# Patient Record
Sex: MALE | Race: WHITE
[De-identification: names, ages, dates, MRNs, and addresses within clinical notes are randomized per-mention and may not be internally consistent; named-entity substitution may affect disease eponyms.]

---

## 2017-11-26 NOTE — CR
EXAM DATE: 17



PATIENT'S AGE: 32





Patient: MARYAM GALVAN



Facility: [place], [place]

Patient ID: [id number]

Site Patient ID: [id number].

Site Accession #: [id number].

: 1985

Study: XRay Spine Lumbar [id number]/26/2017 5:46:08 AM

Ordering Physician: Doctor Drake



Final Report: 

INDICATION:

Low back pain 



TECHNIQUE:

Lumbar spine 3 view.



COMPARISON:

None 



FINDINGS:

Bones: Alignment is normal. No fractures or significant bone lesions. 

Joints: Disc spaces and facets are unremarkable. 

Soft tissues: Unremarkable. 



IMPRESSION:

Unremarkable lumbar spine.





Dictated by Suzie Navas MD @ 2017 6:13AM

(Electronic Signature)



Report Signed by Proxy.
CRAIG

## 2017-11-26 NOTE — EDM.PDOC
ED HPI GENERAL MEDICAL PROBLEM





- General


Chief Complaint: Back Pain or Injury


Stated Complaint: BACK PAIN


Time Seen by Provider: 11/26/17 04:17


Source of Information: Reports: Patient


History Limitations: Reports: No Limitations





- History of Present Illness


INITIAL COMMENTS - FREE TEXT/NARRATIVE: 





32 year old male presents to ED with complaint of lower back pain. Pain began 3-

4 weeks. He denies any associated trauma that may have initiated the pain. Pain 

is shocking quality and occasionally radiates down his left leg to above the 

knee. Pain worsened significantly today and he is having difficulty with sitting

, standing or laying down, therefore he decided to present to the ED. He denies 

any significant tenderness along the spine and denies any loss of bladder or 

bowel control. He does not have a PCP and this is his first time seeking care 

for the back pain. 


  ** Lower Posterior Back


Pain Score (Numeric/FACES): 8





- Related Data


 Allergies











Allergy/AdvReac Type Severity Reaction Status Date / Time


 


Penicillins Allergy  Anaphylactic Verified 11/26/17 04:28





   Shock  











Home Meds: 


 Home Meds





Orphenadrine [Norflex] 100 mg PO BID 5 Days #10 tab.er 11/26/17 [Rx]











ED ROS GENERAL





- Review of Systems


Review Of Systems: See Below


Constitutional: Reports: No Symptoms


HEENT: Reports: No Symptoms


Respiratory: Reports: No Symptoms


Cardiovascular: Reports: No Symptoms


Endocrine: Reports: No Symptoms


GI/Abdominal: Reports: No Symptoms


: Reports: No Symptoms


Musculoskeletal: Reports: Back Pain


Skin: Reports: No Symptoms


Neurological: Reports: No Symptoms


Psychiatric: Reports: No Symptoms


Hematologic/Lymphatic: Reports: No Symptoms


Immunologic: Reports: No Symptoms





ED EXAM,LOWER BACK PAIN/INJURY





- Physical Exam


Exam: See Below


General Appearance: Alert, WD/WN


Eye Exam: Bilateral Eye: PERRL


Ears: Normal External Exam, Normal Canal, Hearing Grossly Normal, Normal TMs


Nose: Normal Inspection, Normal Mucosa, No Blood


Throat/Mouth: Normal Inspection, Normal Oropharynx, No Airway Compromise


Head: Atraumatic, Normocephalic


Neck: Normal Inspection, Supple, Non-Tender, Full Range of Motion


Respiratory/Chest: No Respiratory Distress, Lungs Clear, Normal Breath Sounds, 

No Accessory Muscle Use, Chest Non-Tender


Cardiovascular: Normal Peripheral Pulses, Regular Rate, Rhythm, No JVD


GI/Abdominal: Normal Bowel Sounds, Soft, Non-Tender


Back Exam: Other (pain with lumbar flexion. SLR negative BL. mild paravertebral 

tenderness BL. no palpable step-offs. )


Extremities: Normal Inspection, Normal Capillary Refill


Neurological: Alert, Normal Mood/Affect, Normal Dorsiflexion, CN II-XII Intact, 

Normal Plantar Flexion, Normal Gait, Normal Reflexes, No Motor/Sensory Deficits

, Oriented x 3


Skin Exam: Warm, Dry, Intact


Lymphatic: No Adenopathy





Course





- Vital Signs


Last Recorded V/S: 


 Last Vital Signs











Temp  36.8 C   11/26/17 04:18


 


Pulse  68   11/26/17 04:18


 


Resp  16   11/26/17 04:18


 


BP  109/69   11/26/17 04:18


 


Pulse Ox  96   11/26/17 04:18














- Orders/Labs/Meds


Orders: 


 Active Orders 24 hr











 Category Date Time Status


 


 Lumbar Spine 2 or 3V [CR] Stat Exams  11/26/17 04:38 Taken


 


 Orphenadrine [Norflex] Med  11/26/17 04:30 Active





 60 mg IM Q12H   








 Medication Orders





Orphenadrine Citrate (Norflex)  60 mg IM Q12H JONATHAN


   Last Admin: 11/26/17 04:54 Dose:  Not Given








Meds: 


Medications











Generic Name Dose Route Start Last Admin





  Trade Name Freq  PRN Reason Stop Dose Admin


 


Orphenadrine Citrate  60 mg  11/26/17 04:30  11/26/17 04:54





  Norflex  IM   Not Given





  Q12H JONATHAN   














Discontinued Medications














Generic Name Dose Route Start Last Admin





  Trade Name Freq  PRN Reason Stop Dose Admin


 


Ketorolac Tromethamine  30 mg  11/26/17 04:27  11/26/17 04:54





  Toradol  IM  11/26/17 04:28  Not Given





  ONETIME ONE   














Departure





- Departure


Time of Disposition: 06:21


Disposition: Home, Self-Care 01


Condition: Good


Clinical Impression: 


 Sciatica





Acute lumbar myofascial strain


Qualifiers:


 Encounter type: initial encounter Qualified Code(s): S39.012A - Strain of 

muscle, fascia and tendon of lower back, initial encounter








- Discharge Information


Prescriptions: 


Orphenadrine [Norflex] 100 mg PO BID 5 Days #10 tab.er


Instructions:  Sciatica, Easy-to-Read, Back Pain, Adult, Easy-to-Read


Referrals: 


PCP,None [Primary Care Provider] - 


Forms:  ED Department Discharge


Additional Instructions: 


The following information is given to patients seen in the emergency department 

who are being discharged to home. This information is to outline your options 

for follow-up care. We provide all patients seen in our emergency department 

with a follow-up referral.





The need for follow-up, as well as the timing and circumstances, are variable 

depending upon the specifics of your emergency department visit.





If you don't have a primary care physician on staff, we will provide you with a 

referral. We always advise you to contact your personal physician following an 

emergency department visit to inform them of the circumstance of the visit and 

for follow-up with them and/or the need for any referrals to a consulting 

specialist.





The emergency department will also refer you to a specialist when appropriate. 

This referral assures that you have the opportunity for followup care with a 

specialist. All of these measure are taken in an effort to provide you with 

optimal care, which includes your followup.





Under all circumstances we always encourage you to contact your private 

physician who remains a resource for coordinating  your care. When calling for 

followup care, please make the office aware that this follow-up is from your 

recent emergency room visit. If for any reason you are refused follow-up, 

please contact the [facility] emergency department at [phone] 

and asked to speak to the emergency department charge nurse.





- Problem List Review


Problem List Initiated/Reviewed/Updated: Yes





- My Orders


Last 24 Hours: 


My Active Orders





11/26/17 04:30


Orphenadrine [Norflex]   60 mg IM Q12H 














- Assessment/Plan


Last 24 Hours: 


My Active Orders





11/26/17 04:30


Orphenadrine [Norflex]   60 mg IM Q12H 











Plan: 





Diagnostics:


Lumbar XR





Therapeutics:


Toradol 30 mg IM single dose, Orphenadrine 60 mg IM single dose 





Assessment:


1. Acute Lumbosacral Strain


-no visible spinal deformities, no spinal tenderness


-no loss of bladder/bowel control


-no history of trauma


-Lumbar XR shows no acute changes 





2. Sciatica, secondary to #1





Plan:


Provided patient education and reassurance. Explained natural course of 

condition. Recommended he avoid prolonged immobilization. Prescribed 

Orphenadrine 100 mg PO BID for 5 days. Recommended following-up with PCP for 

rferral to PT if no improvement of symptoms within 1-2 weeks. Recommended Using 

OTC NSAID's or Acetaminophen for pain control.

## 2018-02-15 NOTE — EDM.PDOC
ED HPI GENERAL MEDICAL PROBLEM





- General


Stated Complaint: BACK PAIN


Time Seen by Provider: 02/15/18 02:35


Source of Information: Reports: Patient


History Limitations: Reports: No Limitations





- History of Present Illness


INITIAL COMMENTS - FREE TEXT/NARRATIVE: 


HISTORY AND PHYSICAL:





History of present illness:


[32-year-old male presenting in emergency department with chief complaint of 

lower back pain radiating into his left lower extremity.





Patient states that he has had intermittent back pain for many years. He has 

been once told he has sciatica. States it today came in because he had 

significantly more pain and was having difficulty with sleeping and walking. 

Pain is primarily located in the left lumbar sparing radiating into the 

posterior aspect of the left leg down to the left calf. He denies any bowel or 

bladder incontinence. Denies any decreased strength or sensation. He has no 

other symptoms and currently denies any chest pain, palpitations, shortness 

breath, syncopal episodes, or focal neurologic deficits.]





Review of systems: 


As per history of present illness and below otherwise all systems reviewed and 

negative.





Past medical history: 


As per history of present illness and as reviewed below otherwise 

noncontributory.





Surgical history: 


As per history of present illness and as reviewed below otherwise 

noncontributory.





Social history: 


No reported history of drug or alcohol abuse.





Family history: 


As per history of present illness and as reviewed below otherwise 

noncontributory.





Physical exam:


HEENT: Atraumatic, normocephalic, pupils reactive, negative for conjunctival 

pallor or scleral icterus, mucous membranes moist, throat clear, neck supple, 

nontender, trachea midline.


Lungs: Clear to auscultation, breath sounds equal bilaterally, chest nontender.


Heart: S1S2, regular, negative for clicks, rubs, or JVD.


Abdomen: Soft, nondistended, nontender. Negative for masses or 

hepatosplenomegaly. Negative for costovertebral tenderness.


Pelvis: Stable nontender.


Genitourinary: Deferred.


Rectal: Deferred.


Extremities: Atraumatic, negative for cords or calf pain. Neurovascular 

unremarkable.


Neuro: Awake, alert, oriented. Cranial nerves II through XII unremarkable. 

Cerebellum unremarkable. Motor and sensory unremarkable throughout. Exam 

nonfocal.





Diagnostics:


[]





Therapeutics:


[Portable 6 mg IM]





Impression: 


[Lumbar back pain/sciatica]





Plan:


[Patient was given 60 mg Toradol IM as well as a prescription for Flexeril. He 

was told to follow-up with primary care physician and that he should 

participate in physical therapy once he is established with a primary care 

physician.]











- Related Data


 Allergies











Allergy/AdvReac Type Severity Reaction Status Date / Time


 


Penicillins Allergy  Anaphylactic Verified 11/26/17 04:28





   Shock  











Home Meds: 


 Home Meds





Orphenadrine [Norflex] 100 mg PO BID 5 Days #10 tab.er 11/26/17 [Rx]


Cyclobenzaprine [Flexeril] 10 mg PO TID #12 tab 02/15/18 [Rx]











Past Medical History


Other Cardiovascular History: Some sort of cardiac issue is unclear as to wha 

it is "[place]"


Other Musculoskeletal History: Right ankle sugery X 3





Social & Family History





- Tobacco Use


Used Tobacco, but Quit: No


Second Hand Smoke Exposure: No





- Caffeine Use


Caffeine Use: Reports: Soda





- Recreational Drug Use


Recreational Drug Use: No





ED ROS GENERAL





- Review of Systems


Review Of Systems: See Below





ED EXAM, GENERAL





- Physical Exam


Exam: See Below





Course





- Orders/Labs/Meds


Meds: 


Medications














Discontinued Medications














Generic Name Dose Route Start Last Admin





  Trade Name Freq  PRN Reason Stop Dose Admin


 


Ketorolac Tromethamine  60 mg  02/15/18 02:46  





  Toradol  IM  02/15/18 02:47  





  ONETIME ONE   














Departure





- Departure


Time of Disposition: 02:59


Disposition: Home, Self-Care 01


Condition: Good


Clinical Impression: 


 Sciatica








- Discharge Information


Prescriptions: 


Cyclobenzaprine [Flexeril] 10 mg PO TID #12 tab


Instructions:  Back Pain, Adult, Sciatica, Sciatica, Easy-to-Read


Referrals: 


PCP,None [Primary Care Provider] -

## 2018-05-28 NOTE — EDM.PDOC
ED HPI GENERAL MEDICAL PROBLEM





- General


Chief Complaint: Neuro Symptoms/Deficits


Stated Complaint: CHEST PAIN, DIZZY, SHAKY


Time Seen by Provider: 05/28/18 17:09


Source of Information: Reports: Patient


History Limitations: Reports: No Limitations





- History of Present Illness


INITIAL COMMENTS - FREE TEXT/NARRATIVE: 


HISTORY AND PHYSICAL:


[]


33-year-old male patient who presented initially chest pain dizziness


History of Present Illness:


[]This started approximately half hour prior to coming to the emergency room he 

had just gotten off. Was eating a hamburger and driving


Extreme vertigo with turning his head





Review of Systems:


As per history of present illness and below otherwise all 


systems reviewed and negative.  





Past medical history:


As per history of present illness and as reviewed below


otherwise noncontributory.





Surgical history:


As per history of present illness and as reviewed below


otherwise noncontributory.





Social history:


No reported history of drug or alcohol abuse.





Family history:


As per history of present illness and as reviewed below


otherwise noncontributory.





Physical exam:


Alert oriented gentleman who is speaking well in full sentences without 

shortness of breath he is nontoxic in appearance


He is avoiding turning his head while speaking


HEENT: Atraumatic, normocehpalic, pupils reactive, negative for conjunctival 

pallor or scleral icterus, mucous membranes moist, throat clear, neck supple, 

nontender, trachea midline.  Tympanic membrane with effusion on the left 

bubbles are visualized no erythema


Lungs: Clear to auscultation, breath sounds equal bilaterally, chest non 

tender.  


Heart: S1S2, regular, negative for clicks, rubs, or JVD.


Abdomen: Soft, nondistended, nontender.  Negative for masses or 

hepatossplenmegaly. Negative for costovertebral tenderness.


Pelvis: Stable nontender.


Genitourinary: Deferred.


Rectal: Deferred


Extremities: Atraumatic, negative for cords or calf pain.  


Neurovascular unremarkable.


Neuro:  Awake, alert, oriented.  Cranial nerves II through XII


unremarkable.  Cerebellum unremarkable.  Motor and sensory unremarkable 

throughout.  Exam nonfocal.  


Discussed with the patient that his EKG shows tachycardia rhythm. Discussed 

that the troponin level was elevated. At this level can be elevated for other 

conditions however at this time he needs to be monitored closely have asked 

that he except a transfer to [facility] for cardiology is 

available. Patient is agreeable to this course of action he is quite anxious





Discussed this case with Dr. Jim Benjamin at [facility] who has accepted this 

patient for transfer. Question Dr. Benjamin if you should give Lovenox and in case 

this person has a pericarditis he requested I not give Lovenox.





Diagnostics:


[]CBC CMP troponin amylase lipase UA urine drug screen EKG chest x-ray





Therapeutics:


[]Normal saline


Aspirin





Impression:


[]Vertigo


Elevated troponin





Plan:


[]Transfer per EMS to [facility]





Definitive disposition and diagnosis as appropriate pending


reevaluation and review of above.  





Onset: Today, Sudden





- Related Data


 Allergies











Allergy/AdvReac Type Severity Reaction Status Date / Time


 


Penicillins Allergy  Anaphylactic Verified 05/28/18 17:10





   Shock  











Home Meds: 


 Home Meds





. [No Known Home Meds]  05/28/18 [History]











Past Medical History





- Past Health History


Medical/Surgical History: Denies Medical/Surgical History


HEENT History: Reports: None


Cardiovascular History: Reports: None


Other Cardiovascular History: Some sort of cardiac issue is unclear as to what 

it is "[place]", pt states this is a hole in his heart


Respiratory History: Reports: None


Gastrointestinal History: Reports: None


Genitourinary History: Reports: None


Other Musculoskeletal History: Right ankle sugery X 3


Neurological History: Reports: None


Psychiatric History: Reports: None


Endocrine/Metabolic History: Reports: None





- Infectious Disease History


Infectious Disease History: Reports: None





- Past Surgical History


Male  Surgical History: Reports: None





Social & Family History





- Family History


Family Medical History: Noncontributory





- Tobacco Use


Smoking Status *Q: Never Smoker


Years of Tobacco use: 4


Packs/Tins Daily: 1





- Caffeine Use


Caffeine Use: Reports: Soda





- Recreational Drug Use


Recreational Drug Use: No





ED ROS GENERAL





- Review of Systems


Review Of Systems: ROS reveals no pertinent complaints other than HPI.





ED EXAM, NEURO





- Physical Exam


Exam: See Below (see dictation)





EKG INTERPRETATION


EKG Date: 05/28/18


Rhythm: Other (Sinus tachycardia)


Rate (Beats/Min): 101


Comparison: NA - No Prior EKG





Course





- Vital Signs


Last Recorded V/S: 





 Last Vital Signs











Temp  36.8 C   05/28/18 17:05


 


Pulse  99   05/28/18 17:05


 


Resp  18   05/28/18 17:05


 


BP  154/86 H  05/28/18 17:05


 


Pulse Ox  97   05/28/18 17:05














- Orders/Labs/Meds


Orders: 





 Active Orders 24 hr











 Category Date Time Status


 


 Cardiac Monitoring [RC] .AS DIRECTED Care  05/28/18 17:08 Active


 


 EKG 12 Lead [EKG Documentation Completion] [RC] STAT Care  05/28/18 17:07 

Active


 


 EKG Documentation Completion [RC] STAT Care  05/28/18 17:08 Active


 


 Oxygen Therapy, ED [RC] ASDIRECTED Care  05/28/18 17:08 Active


 


 Chest 1V Frontal [CR] Stat Exams  05/28/18 17:09 Ordered


 


 Sinus Comp Min 3V [CR] Stat Exams  05/28/18 17:45 Ordered


 


 CULTURE STREP A CONFIRMATION [RM] Stat Lab  05/28/18 17:30 Results


 


 CULTURE URINE [RM] Stat Lab  05/28/18 17:30 Ordered


 


 DRUG SCREEN, URINE [URCHEM] Stat Lab  05/28/18 17:30 Ordered


 


 STREP SCRN A RAPID W CULT CONF [RM] Stat Lab  05/28/18 17:30 Ordered


 


 UA W/MICROSCOPIC [URIN] Stat Lab  05/28/18 17:30 Ordered


 


 Sodium Chloride 0.9% [Saline Flush] Med  05/28/18 17:08 Active





 10 ml FLUSH ASDIRECTED PRN   


 


 Sodium Chloride 0.9% [Saline Flush] Med  05/28/18 17:08 Active





 2.5 ml FLUSH ASDIRECTED PRN   


 


 Saline Lock Insert [OM.PC] Stat Oth  05/28/18 17:08 Ordered








 Medication Orders





Sodium Chloride (Saline Flush)  10 ml FLUSH ASDIRECTED PRN


   PRN Reason: Keep Vein Open


Sodium Chloride (Saline Flush)  2.5 ml FLUSH ASDIRECTED PRN


   PRN Reason: Keep Vein Open








Labs: 





 Laboratory Tests











  05/28/18 05/28/18 05/28/18 Range/Units





  17:18 17:18 17:18 


 


WBC    7.49  (4.0-11.0)  K/uL


 


RBC    5.49  (4.50-5.90)  M/uL


 


Hgb    16.0  (13.0-17.0)  g/dL


 


Hct    46.0  (38.0-50.0)  %


 


MCV    83.8  (80.0-98.0)  fL


 


MCH    29.1  (27.0-32.0)  pg


 


MCHC    34.8  (31.0-37.0)  g/dL


 


RDW Std Deviation    37.6  (28.0-62.0)  fl


 


RDW Coeff of Juan    12  (11.0-15.0)  %


 


Plt Count    237  (150-400)  K/uL


 


MPV    9.50  (7.40-12.00)  fL


 


Neut % (Auto)    60.4  (48.0-80.0)  %


 


Lymph % (Auto)    29.6  (16.0-40.0)  %


 


Mono % (Auto)    8.0  (0.0-15.0)  %


 


Eos % (Auto)    1.9  (0.0-7.0)  %


 


Baso % (Auto)    0.1  (0.0-1.5)  %


 


Neut # (Auto)    4.5  (1.4-5.7)  K/uL


 


Lymph # (Auto)    2.2  (0.6-2.4)  K/uL


 


Mono # (Auto)    0.6  (0.0-0.8)  K/uL


 


Eos # (Auto)    0.1  (0.0-0.7)  K/uL


 


Baso # (Auto)    0.0  (0.0-0.1)  K/uL


 


Nucleated RBC %    0.0  /100WBC


 


Nucleated RBCs #    0  K/uL


 


INR     


 


Sodium  137    (136-148)  mmol/L


 


Potassium  3.8    (3.5-5.1)  mmol/L


 


Chloride  102    ()  mmol/L


 


Carbon Dioxide  26.0    (21.0-32.0)  mmol/L


 


BUN  14    (7.0-18.0)  mg/dL


 


Creatinine  1.1    (0.8-1.3)  mg/dL


 


Est Cr Clr Drug Dosing  94.14    mL/min


 


Estimated GFR (MDRD)  > 60.0    ml/min


 


Glucose  137 H    ()  mg/dL


 


Calcium  8.5    (8.5-10.1)  mg/dL


 


Total Bilirubin  0.8    (0.2-1.0)  mg/dL


 


AST  36    (15-37)  IU/L


 


ALT  54    (14-63)  IU/L


 


Alkaline Phosphatase  54    ()  U/L


 


Ammonia   <17 L   (19-54)  ug/dL


 


Troponin I  0.161 H*    (0.000-0.056)  ng/mL


 


Total Protein  8.0    (6.4-8.2)  g/dL


 


Albumin  3.9    (3.4-5.0)  g/dL


 


Globulin  4.1 H    (2.0-3.5)  g/dL


 


Albumin/Globulin Ratio  1.0 L    (1.3-2.8)  


 


Amylase  22 L    ()  U/L


 


Lipase  87    ()  U/L


 


Urine Color     


 


Urine Appearance     


 


Urine pH     (5.0-8.0)  


 


Ur Specific Gravity     (1.001-1.035)  


 


Urine Protein     (NEGATIVE)  mg/dL


 


Urine Glucose (UA)     (NEGATIVE)  mg/dL


 


Urine Ketones     (NEGATIVE)  mg/dL


 


Urine Occult Blood     (NEGATIVE)  


 


Urine Nitrite     (NEGATIVE)  


 


Urine Bilirubin     (NEGATIVE)  


 


Urine Urobilinogen     (<2.0)  EU/dL


 


Ur Leukocyte Esterase     (NEGATIVE)  


 


Urine Opiates Screen     (NEGATIVE)  


 


Ur Oxycodone Screen     (NEGATIVE)  


 


Urine Methadone Screen     (NEGATIVE)  


 


Ur Barbiturates Screen     (NEGATIVE)  


 


Ur Phencyclidine Scrn     (NEGATIVE)  


 


Ur Amphetamine Screen     (NEGATIVE)  


 


U Methamphetamines Scrn     (NEGATIVE)  


 


U Benzodiazepines Scrn     (NEGATIVE)  


 


U Cocaine Metab Screen     (NEGATIVE)  


 


U Marijuana (THC) Screen     (NEGATIVE)  














  05/28/18 05/28/18 05/28/18 Range/Units





  17:18 17:30 17:30 


 


WBC     (4.0-11.0)  K/uL


 


RBC     (4.50-5.90)  M/uL


 


Hgb     (13.0-17.0)  g/dL


 


Hct     (38.0-50.0)  %


 


MCV     (80.0-98.0)  fL


 


MCH     (27.0-32.0)  pg


 


MCHC     (31.0-37.0)  g/dL


 


RDW Std Deviation     (28.0-62.0)  fl


 


RDW Coeff of Juan     (11.0-15.0)  %


 


Plt Count     (150-400)  K/uL


 


MPV     (7.40-12.00)  fL


 


Neut % (Auto)     (48.0-80.0)  %


 


Lymph % (Auto)     (16.0-40.0)  %


 


Mono % (Auto)     (0.0-15.0)  %


 


Eos % (Auto)     (0.0-7.0)  %


 


Baso % (Auto)     (0.0-1.5)  %


 


Neut # (Auto)     (1.4-5.7)  K/uL


 


Lymph # (Auto)     (0.6-2.4)  K/uL


 


Mono # (Auto)     (0.0-0.8)  K/uL


 


Eos # (Auto)     (0.0-0.7)  K/uL


 


Baso # (Auto)     (0.0-0.1)  K/uL


 


Nucleated RBC %     /100WBC


 


Nucleated RBCs #     K/uL


 


INR  1.05    


 


Sodium     (136-148)  mmol/L


 


Potassium     (3.5-5.1)  mmol/L


 


Chloride     ()  mmol/L


 


Carbon Dioxide     (21.0-32.0)  mmol/L


 


BUN     (7.0-18.0)  mg/dL


 


Creatinine     (0.8-1.3)  mg/dL


 


Est Cr Clr Drug Dosing     mL/min


 


Estimated GFR (MDRD)     ml/min


 


Glucose     ()  mg/dL


 


Calcium     (8.5-10.1)  mg/dL


 


Total Bilirubin     (0.2-1.0)  mg/dL


 


AST     (15-37)  IU/L


 


ALT     (14-63)  IU/L


 


Alkaline Phosphatase     ()  U/L


 


Ammonia     (19-54)  ug/dL


 


Troponin I     (0.000-0.056)  ng/mL


 


Total Protein     (6.4-8.2)  g/dL


 


Albumin     (3.4-5.0)  g/dL


 


Globulin     (2.0-3.5)  g/dL


 


Albumin/Globulin Ratio     (1.3-2.8)  


 


Amylase     ()  U/L


 


Lipase     ()  U/L


 


Urine Color   YELLOW   


 


Urine Appearance   CLEAR   


 


Urine pH   6.5   (5.0-8.0)  


 


Ur Specific Gravity   1.020   (1.001-1.035)  


 


Urine Protein   NEGATIVE   (NEGATIVE)  mg/dL


 


Urine Glucose (UA)   NEGATIVE   (NEGATIVE)  mg/dL


 


Urine Ketones   NEGATIVE   (NEGATIVE)  mg/dL


 


Urine Occult Blood   NEGATIVE   (NEGATIVE)  


 


Urine Nitrite   NEGATIVE   (NEGATIVE)  


 


Urine Bilirubin   NEGATIVE   (NEGATIVE)  


 


Urine Urobilinogen   0.2   (<2.0)  EU/dL


 


Ur Leukocyte Esterase   NEGATIVE   (NEGATIVE)  


 


Urine Opiates Screen    NEGATIVE  (NEGATIVE)  


 


Ur Oxycodone Screen    NEGATIVE  (NEGATIVE)  


 


Urine Methadone Screen    NEGATIVE  (NEGATIVE)  


 


Ur Barbiturates Screen    NEGATIVE  (NEGATIVE)  


 


Ur Phencyclidine Scrn    NEGATIVE  (NEGATIVE)  


 


Ur Amphetamine Screen    NEGATIVE  (NEGATIVE)  


 


U Methamphetamines Scrn    NEGATIVE  (NEGATIVE)  


 


U Benzodiazepines Scrn    NEGATIVE  (NEGATIVE)  


 


U Cocaine Metab Screen    NEGATIVE  (NEGATIVE)  


 


U Marijuana (THC) Screen    NEGATIVE  (NEGATIVE)  











Meds: 





Medications











Generic Name Dose Route Start Last Admin





  Trade Name Freq  PRN Reason Stop Dose Admin


 


Sodium Chloride  10 ml  05/28/18 17:08  





  Saline Flush  FLUSH   





  ASDIRECTED PRN   





  Keep Vein Open   





     





     





     


 


Sodium Chloride  2.5 ml  05/28/18 17:08  





  Saline Flush  FLUSH   





  ASDIRECTED PRN   





  Keep Vein Open   





     





     





     














Discontinued Medications














Generic Name Dose Route Start Last Admin





  Trade Name Freq  PRN Reason Stop Dose Admin


 


Aspirin  325 mg  05/28/18 17:26  05/28/18 17:47





  Aspirin  PO  05/28/18 17:27  325 mg





  ONETIME ONE   Administration





     





     





     





     














Departure





- Departure


Time of Disposition: 18:29


Disposition: DC[facility]fer to [facility]


Condition: Good


Clinical Impression: 


 Elevated troponin








- Discharge Information


Referrals: 


PCP,None [Primary Care Provider] - 


Additional Instructions: 


The following information is given to patients seen in the emergency department 

who are being discharged to home. This information is to outline your options 

for follow-up care. We provide all patients seen in our emergency department 

with a follow-up referral.





The need for follow-up, as well as the timing and circumstances, are variable 

depending upon the specifics of your emergency department visit.





If you don't have a primary care physician on staff, we will provide you with a 

referral. We always advise you to contact your personal physician following an 

emergency department visit to inform them of the circumstance of the visit and 

for follow-up with them and/or the need for any referrals to a consulting 

specialist.





The emergency department will also refer you to a specialist when appropriate. 

This referral assures that you have the opportunity for followup care with a 

specialist. All of these measure are taken in an effort to provide you with 

optimal care, which includes your followup.





Under all circumstances we always encourage you to contact your private 

physician who remains a resource for coordinating  your care. When calling for 

followup care, please make the office aware that this follow-up is from your 

recent emergency room visit. If for any reason you are refused follow-up, 

please contact the [facility] emergency department at [phone] 

and asked to speak to the emergency department charge nurse.





- My Orders


Last 24 Hours: 





My Active Orders





05/28/18 17:08


Cardiac Monitoring [RC] .AS DIRECTED 


EKG Documentation Completion [RC] STAT 


Oxygen Therapy, ED [RC] ASDIRECTED 


Sodium Chloride 0.9% [Saline Flush]   10 ml FLUSH ASDIRECTED PRN 


Sodium Chloride 0.9% [Saline Flush]   2.5 ml FLUSH ASDIRECTED PRN 


Saline Lock Insert [OM.PC] Stat 





05/28/18 17:09


Chest 1V Frontal [CR] Stat 





05/28/18 17:30


CULTURE STREP A CONFIRMATION [RM] Stat 


CULTURE URINE [RM] Stat 


DRUG SCREEN, URINE [URCHEM] Stat 


STREP SCRN A RAPID W CULT CONF [RM] Stat 


UA W/MICROSCOPIC [URIN] Stat 





05/28/18 17:45


Sinus Comp Min 3V [CR] Stat 














- Assessment/Plan


Last 24 Hours: 





My Active Orders





05/28/18 17:08


Cardiac Monitoring [RC] .AS DIRECTED 


EKG Documentation Completion [RC] STAT 


Oxygen Therapy, ED [RC] ASDIRECTED 


Sodium Chloride 0.9% [Saline Flush]   10 ml FLUSH ASDIRECTED PRN 


Sodium Chloride 0.9% [Saline Flush]   2.5 ml FLUSH ASDIRECTED PRN 


Saline Lock Insert [OM.PC] Stat 





05/28/18 17:09


Chest 1V Frontal [CR] Stat 





05/28/18 17:30


CULTURE STREP A CONFIRMATION [RM] Stat 


CULTURE URINE [RM] Stat 


DRUG SCREEN, URINE [URCHEM] Stat 


STREP SCRN A RAPID W CULT CONF [RM] Stat 


UA W/MICROSCOPIC [URIN] Stat 





05/28/18 17:45


Sinus Comp Min 3V [CR] Stat

## 2018-05-29 NOTE — CR
EXAM DATE: 18



PATIENT'S AGE: 33





Patient: MARYAM GALVAN



Facility: [place], [place]

Patient ID: [id number]

Site Patient ID: [id number].

Site Accession #: [id number]

: 1985

Study: XRay Chest [id number]/28/2018 5:49:28 PM

Ordering Physician: Doctor Temp



Final Report: 

INDICATION:

Dizziness 



TECHNIQUE:

Single view chest.



FINDINGS:

The lungs are clear. The heart, mediastinum and pulmonary vessels are of normal 
size. There is no evidence of pleural disease.



IMPRESSION:

Negative chest.





Dictated by Elvira Connors MD @ May 28 2018 6:06PM

(Electronic Signature)





Report Signed by Proxy.
CRAIG

## 2020-01-08 ENCOUNTER — HOSPITAL ENCOUNTER (EMERGENCY)
Dept: HOSPITAL 56 - MW.ED | Age: 35
Discharge: HOME | End: 2020-01-08
Payer: COMMERCIAL

## 2020-01-08 DIAGNOSIS — K02.9: ICD-10-CM

## 2020-01-08 DIAGNOSIS — K04.7: Primary | ICD-10-CM

## 2020-01-08 DIAGNOSIS — Z79.899: ICD-10-CM

## 2020-01-08 DIAGNOSIS — Z88.0: ICD-10-CM

## 2020-01-08 NOTE — EDM.PDOC
ED HPI GENERAL MEDICAL PROBLEM





- General


Chief Complaint: ENT Problem


Stated Complaint: sinus


Time Seen by Provider: 01/08/20 20:19


Source of Information: Reports: Patient


History Limitations: Reports: No Limitations





- History of Present Illness


INITIAL COMMENTS - FREE TEXT/NARRATIVE: 


HISTORY AND PHYSICAL:





History of present illness:


Patient is a 34-year-old male who presents to the emergency room with 

complaints of right upper gumline/cheek tenderness and swelling.  He states he 

does have "a bad tooth at the site" but the tooth itself has not been bothering 

him.  He is concerned he may have a dental abscess or sinusitis.  Patient 

denies any fever, chills, headache, change in vision, syncope or near syncope. 

Denies any chest pain, back pain, shortness of breath or cough. Denies any GI 

or  symptoms. Patient has been eating and drinking appropriately.





Review of systems: 


As per history of present illness and below otherwise all systems reviewed and 

negative.





Past medical history: 


As per history of present illness and as reviewed below otherwise 

noncontributory.





Surgical history: 


As per history of present illness and as reviewed below otherwise 

noncontributory.





Social history: 


See social history for further information





Family history: 


As per history of present illness and as reviewed below otherwise 

noncontributory.





Physical exam:


General: Well-developed and well-nourished 34-year-old male.  Alert and 

oriented.  Nontoxic-appearing and in no acute distress.


HEENT: Atraumatic, normocephalic, pupils equal and reactive bilaterally, 

negative for conjunctival pallor or scleral icterus, mucous membranes moist, 

TMs normal bilaterally, throat clear, does have dental decay noted at #5 and #4 

with mild gumline swelling and soft tissue swelling visible to the upper cheek, 

neck supple, nontender, trachea midline. No drooling or trismus noted. No 

meningeal signs. No hot potato voice noted. 


Lungs: Clear to auscultation, breath sounds equal bilaterally, chest nontender.


Heart: S1S2, regular rate and rhythm without overt murmur


Abdomen: Soft, nondistended, nontender.


Skin: Intact, warm, dry. No lesions or rashes noted.


Extremities: Atraumatic, moves all extremities per self without difficulty or 

deficits, negative for cords or calf pain. Neurovascular unremarkable.


Neuro: Awake, alert, oriented. Cranial nerves II through XII unremarkable. 

Cerebellum unremarkable. Motor and sensory unremarkable throughout. Exam 

nonfocal.





Notes:


Patient has penicillin allergy will use clindamycin.  Signs and symptoms that 

would prompt him to return to the emergency room were reviewed and discussed.  

Supportive care measures were reviewed and discussed. Voices understanding and 

is agreeable to plan of care. Denies any further questions or concerns at this 

time.





Diagnostics:


None





Therapeutics:


None





Prescription:


Clindamycin





Impression: 


Dental Abscess





Plan:


1.  Take the antibiotic as prescribed.


2.  Can alternate Tylenol and ibuprofen for pain and fever management.


3.  Follow-up with your primary care provider as we discussed.  Return to the 

ED as needed and as discussed.





Definitive disposition and diagnosis as appropriate pending reevaluation and 

review of above.








- Related Data


 Allergies











Allergy/AdvReac Type Severity Reaction Status Date / Time


 


Penicillins Allergy  Anaphylactic Verified 01/08/20 20:36





   Shock  











Home Meds: 


 Home Meds





Clindamycin HCl 300 mg PO TID 7 Days #21 capsule 01/08/20 [Rx]


Sertraline [Zoloft] 0 tab 01/08/20 [History]











Past Medical History





- Past Health History


Medical/Surgical History: Denies Medical/Surgical History


HEENT History: Reports: None


Cardiovascular History: Reports: None


Other Cardiovascular History: Some sort of cardiac issue is unclear as to what 

it is "Pulmonary [place]", pt states this is a hole in his heart


Respiratory History: Reports: None


Gastrointestinal History: Reports: None


Genitourinary History: Reports: None


Other Musculoskeletal History: Right ankle sugery X 3


Neurological History: Reports: None


Psychiatric History: Reports: None


Endocrine/Metabolic History: Reports: None





- Infectious Disease History


Infectious Disease History: Reports: None





- Past Surgical History


Male  Surgical History: Reports: None





Social & Family History





- Family History


Family Medical History: Noncontributory





- Caffeine Use


Caffeine Use: Reports: Soda





ED ROS ENT





- Review of Systems


Review Of Systems: Comprehensive ROS is negative, except as noted in HPI.





ED EXAM, ENT





- Physical Exam


Exam: See Below (See dictation)





Course





- Vital Signs


Last Recorded V/S: 


 Last Vital Signs











Temp  97.5 F   01/08/20 20:37


 


Pulse  95   01/08/20 20:37


 


Resp  16   01/08/20 20:37


 


BP  140/81   01/08/20 20:37


 


Pulse Ox  98   01/08/20 20:37














Departure





- Departure


Time of Disposition: 20:40


Disposition: Home, Self-Care 01


Clinical Impression: 


 Dental abscess








- Discharge Information


Prescriptions: 


Clindamycin HCl 300 mg PO TID 7 Days #21 capsule


Instructions:  Dental Abscess, Easy-to-Read


Referrals: 


PCP,Not In Area [Primary Care Provider] - 


Forms:  ED Department Discharge


Additional Instructions: 


The following information is given to patients seen in the emergency department 

who are being discharged to home. This information is to outline your options 

for follow-up care. We provide all patients seen in our emergency department 

with a follow-up referral.





The need for follow-up, as well as the timing and circumstances, are variable 

depending upon the specifics of your emergency department visit.





If you don't have a primary care physician on staff, we will provide you with a 

referral. We always advise you to contact your personal physician following an 

emergency department visit to inform them of the circumstance of the visit and 

for follow-up with them and/or the need for any referrals to a consulting 

specialist.





The emergency department will also refer you to a specialist when appropriate. 

This referral assures that you have the opportunity for follow-up care with a 

specialist. All of these measure are taken in an effort to provide you with 

optimal care, which includes your follow-up.





Under all circumstances we always encourage you to contact your private 

physician who remains a resource for coordinating your care. When calling for 

follow-up care, please make the office aware that this follow-up is from your 

recent emergency room visit. If for any reason you are refused follow-up, 

please contact the [facility] Emergency 

Department at [phone] and asked to speak to the emergency department 

charge nurse.





[facility]


Primary Care


[address] [place] [id number]


Phone: [phone]


Fax: [phone]





[address] [address]


Phone: [phone]


Fax: [phone]





1.  Take the antibiotic as prescribed.


2.  Can alternate Tylenol and ibuprofen for pain and fever management.


3.  Follow-up with your primary care provider as we discussed.  Return to the 

ED as needed and as discussed.





Sepsis Event Note





- Focused Exam


Vital Signs: 


 Vital Signs











  Temp Pulse Resp BP Pulse Ox


 


 01/08/20 20:37  97.5 F  95  16  140/81  98











Date Exam was Performed: 01/08/20


Time Exam was Performed: 21:52